# Patient Record
Sex: FEMALE | Race: WHITE
[De-identification: names, ages, dates, MRNs, and addresses within clinical notes are randomized per-mention and may not be internally consistent; named-entity substitution may affect disease eponyms.]

---

## 2019-11-26 ENCOUNTER — HOSPITAL ENCOUNTER (INPATIENT)
Dept: HOSPITAL 63 - ER | Age: 59
LOS: 1 days | Discharge: HOME | DRG: 918 | End: 2019-11-27
Attending: INTERNAL MEDICINE | Admitting: INTERNAL MEDICINE
Payer: COMMERCIAL

## 2019-11-26 VITALS — SYSTOLIC BLOOD PRESSURE: 117 MMHG | DIASTOLIC BLOOD PRESSURE: 76 MMHG

## 2019-11-26 VITALS — DIASTOLIC BLOOD PRESSURE: 102 MMHG | SYSTOLIC BLOOD PRESSURE: 143 MMHG

## 2019-11-26 VITALS — SYSTOLIC BLOOD PRESSURE: 136 MMHG | DIASTOLIC BLOOD PRESSURE: 80 MMHG

## 2019-11-26 VITALS — WEIGHT: 174.56 LBS | BODY MASS INDEX: 32.96 KG/M2 | HEIGHT: 61 IN

## 2019-11-26 VITALS — DIASTOLIC BLOOD PRESSURE: 84 MMHG | SYSTOLIC BLOOD PRESSURE: 134 MMHG

## 2019-11-26 DIAGNOSIS — Z82.5: ICD-10-CM

## 2019-11-26 DIAGNOSIS — I10: ICD-10-CM
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
## 2019-11-26 DIAGNOSIS — T43.291A: Primary | ICD-10-CM

## 2019-11-26 DIAGNOSIS — E78.5: ICD-10-CM

## 2019-11-26 DIAGNOSIS — F17.210: ICD-10-CM

## 2019-11-26 DIAGNOSIS — Z90.710: ICD-10-CM

## 2019-11-26 DIAGNOSIS — J44.9: ICD-10-CM

## 2019-11-26 DIAGNOSIS — F12.90: ICD-10-CM

## 2019-11-26 DIAGNOSIS — E78.00: ICD-10-CM

## 2019-11-26 DIAGNOSIS — F32.9: ICD-10-CM

## 2019-11-26 DIAGNOSIS — Z82.49: ICD-10-CM

## 2019-11-26 DIAGNOSIS — Z90.49: ICD-10-CM

## 2019-11-26 LAB
ALBUMIN SERPL-MCNC: 3.9 G/DL (ref 3.4–5)
ALP SERPL-CCNC: 107 U/L (ref 46–116)
ALT SERPL-CCNC: 33 U/L (ref 14–59)
ANION GAP SERPL CALC-SCNC: 11 MMOL/L (ref 6–14)
APTT PPP: YELLOW S
AST SERPL-CCNC: 19 U/L (ref 15–37)
BACTERIA #/AREA URNS HPF: (no result) /HPF
BASOPHILS # BLD AUTO: 0 X10^3/UL (ref 0–0.2)
BASOPHILS NFR BLD: 1 % (ref 0–3)
BILIRUB DIRECT SERPL-MCNC: 0.1 MG/DL (ref 0–0.2)
BILIRUB SERPL-MCNC: 0.4 MG/DL (ref 0.2–1)
BILIRUB UR QL STRIP: (no result)
CA-I SERPL ISE-MCNC: 9 MG/DL (ref 7–20)
CALCIUM SERPL-MCNC: 9.2 MG/DL (ref 8.5–10.1)
CHLORIDE SERPL-SCNC: 101 MMOL/L (ref 98–107)
CO2 SERPL-SCNC: 25 MMOL/L (ref 21–32)
CREAT SERPL-MCNC: 0.8 MG/DL (ref 0.6–1)
EOSINOPHIL NFR BLD: 0 % (ref 0–3)
EOSINOPHIL NFR BLD: 0 X10^3/UL (ref 0–0.7)
ERYTHROCYTE [DISTWIDTH] IN BLOOD BY AUTOMATED COUNT: 12.8 % (ref 11.5–14.5)
FIBRINOGEN PPP-MCNC: CLEAR MG/DL
GFR SERPLBLD BASED ON 1.73 SQ M-ARVRAT: 73.9 ML/MIN
GLUCOSE SERPL-MCNC: 116 MG/DL (ref 70–99)
GLUCOSE UR STRIP-MCNC: (no result) MG/DL
HCT VFR BLD CALC: 37.5 % (ref 36–47)
HGB BLD-MCNC: 12.7 G/DL (ref 12–15.5)
LYMPHOCYTES # BLD: 1.4 X10^3/UL (ref 1–4.8)
LYMPHOCYTES NFR BLD AUTO: 22 % (ref 24–48)
MAGNESIUM SERPL-MCNC: 2 MG/DL (ref 1.8–2.4)
MCH RBC QN AUTO: 31 PG (ref 25–35)
MCHC RBC AUTO-ENTMCNC: 34 G/DL (ref 31–37)
MCV RBC AUTO: 91 FL (ref 79–100)
MONO #: 0.5 X10^3/UL (ref 0–1.1)
MONOCYTES NFR BLD: 7 % (ref 0–9)
NEUT #: 4.7 X10^3UL (ref 1.8–7.7)
NEUTROPHILS NFR BLD AUTO: 70 % (ref 31–73)
NITRITE UR QL STRIP: (no result)
PLATELET # BLD AUTO: 298 X10^3/UL (ref 140–400)
POTASSIUM SERPL-SCNC: 3.9 MMOL/L (ref 3.5–5.1)
PROT SERPL-MCNC: 7.7 G/DL (ref 6.4–8.2)
RBC # BLD AUTO: 4.12 X10^6/UL (ref 3.5–5.4)
RBC #/AREA URNS HPF: (no result) /HPF (ref 0–2)
SODIUM SERPL-SCNC: 137 MMOL/L (ref 136–145)
SP GR UR STRIP: 1.01
SQUAMOUS #/AREA URNS LPF: (no result) /LPF
UROBILINOGEN UR-MCNC: 0.2 MG/DL
WBC # BLD AUTO: 6.6 X10^3/UL (ref 4–11)
WBC #/AREA URNS HPF: (no result) /HPF (ref 0–4)

## 2019-11-26 PROCEDURE — 80048 BASIC METABOLIC PNL TOTAL CA: CPT

## 2019-11-26 PROCEDURE — 80053 COMPREHEN METABOLIC PANEL: CPT

## 2019-11-26 PROCEDURE — 85025 COMPLETE CBC W/AUTO DIFF WBC: CPT

## 2019-11-26 PROCEDURE — 80076 HEPATIC FUNCTION PANEL: CPT

## 2019-11-26 PROCEDURE — 83735 ASSAY OF MAGNESIUM: CPT

## 2019-11-26 PROCEDURE — 96360 HYDRATION IV INFUSION INIT: CPT

## 2019-11-26 PROCEDURE — 85027 COMPLETE CBC AUTOMATED: CPT

## 2019-11-26 PROCEDURE — 93005 ELECTROCARDIOGRAM TRACING: CPT

## 2019-11-26 PROCEDURE — 81001 URINALYSIS AUTO W/SCOPE: CPT

## 2019-11-26 PROCEDURE — 36415 COLL VENOUS BLD VENIPUNCTURE: CPT

## 2019-11-26 PROCEDURE — 99406 BEHAV CHNG SMOKING 3-10 MIN: CPT

## 2019-11-26 NOTE — HP
ADMIT DATE:  11/26/2019



HISTORY OF PRESENT ILLNESS:  The patient is a 59-year-old  female

patient who presented to the Emergency Room with a report that she accidentally

took a total of 10 of her Wellbutrin tablets over the last 12 hours.  The

patient stated that she took two normal dose of Wellbutrin yesterday, which

consisted two of her 150 mg tablets at the normal time.  She stated that later

on she was having some body aches and cough and accidentally picked up her new

prescription for Wellbutrin thinking that it was her boyfriend's prescription

for ibuprofen.  She stated that she took 8 additional tablets of 300 mg

Wellbutrin XR in the next 12 hours.  She stated that she is feeling very anxious

and she is feeling a little bit short of breath.  Denied any chest pain.  She

realized that she has taken the wrong pills when her boyfriend had looked at the

pill container and realized the patient has been taking her prescription of

Wellbutrin, not his prescription of ibuprofen.  She has had no vomiting or

diarrhea.  Denied any thoughts or intents of self-harm.  She was evaluated in

the Emergency Room and in consultation with the Poison Control Center, a

decision was made to admit her to the hospital for observation.  Her EKG showed

that she has normal sinus rhythm.  There is no QRS widening or QT prolongation,

and basically, the patient will be admitted for 24-hour observation, watching

for QT prolongation and QRS widening and the recommendation of Poison Control

Center was discussed with the patient and was admitted on telemetry bed.  We

will monitor her EKG at least every 2 hours.  Monitor her continuously and treat

her accordingly.



PAST MEDICAL HISTORY:  Significant for hypertension, hyperlipidemia, chronic

obstructive pulmonary disease.



PAST SURGICAL HISTORY:  Cholecystectomy, total abdominal hysterectomy, bilateral

salpingo-oophorectomy, appendectomy.



ALLERGIES:  She has no known drug allergies.



MEDICATIONS:  She is currently on Crestor 10 mg once a day at bedtime,

amlodipine besylate/olmesartan 5/20 one tablet once a day, Wellbutrin 300 mg

once a day, escitalopram oxalate 10 mg daily.  She is on lorazepam 1 mg as

needed at bedtime and Singulair 10 mg at bedtime.



FAMILY HISTORY:  She has one brother who is older at age 61, has myocardial

infarction and COPD.  Father is alive at the age of 79, known to have COPD. 

Mother is alive and has osteoarthritis.



SOCIAL HISTORY:  She is , has 1 daughter from previous marriage.  She

currently lives with her common law .  She smokes 2 cigarettes and then

he smokes marijuana.  Drinks 3 glasses of wine every day.  Work as a loan

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



REVIEW OF SYSTEMS:  The patient denied any blurring of vision, cataract,

glaucoma or macular degeneration.  Denied any earache, tinnitus or sensorineural

deafness.  Denied any nosebleeds, stuffy nose or postnasal drip.  Denied any

sore throat, sore tongue, toothache, hoarseness of voice or difficulty

swallowing.  She denied any hematemesis, melena, hematochezia.  Denied any

dysuria, frequency or hematuria.  Denied any chest pain, did complain of

shortness of breath.  Denied any cough, phlegm or hemoptysis.  Did complain of

some dizziness.



PHYSICAL EXAMINATION:

GENERAL:  On arrival to the Emergency Room, she looked well and was clearly in

no apparent respiratory distress.  No pallor, jaundice, cyanosis or thyromegaly.

 No jugular venous distention.  No limb edema.

VITAL SIGNS:  Her heart rate was 91, blood pressure was 146/93, temperature was

98, respiratory rate was 24, and oxygen saturation was 99% on room air.

HEAD, EYES, EARS, NOSE AND THROAT:  Showed she is normocephalic, atraumatic.

NECK:  Supple.

HEART:  Showed normal first and second heart sounds.  No gallop or murmur.

CHEST:  Clear to auscultation.  No crepitation or rhonchi.

ABDOMEN:  Distended, soft, nontender.

NEUROLOGIC:  She was awake, alert, responding appropriately.  All cranial nerves

are intact.

EXTREMITIES:  She moves extremities without difficulty.  She ambulates without

assistance or assistive devices.



LABORATORY DATA:  Showed a serum sodium 137, potassium 3.9, chloride 101,

bicarbonate 25, anion gap of 11, BUN 9, creatinine 0.8, estimated GFR was 74 mL

per minute.  Her glucose was 116, calcium was 9.2, magnesium 2.  Total

bilirubin, AST, ALT, alkaline phosphatase were normal.  Total protein was 7.7,

albumin was 3.9.  White cell count was 6600, hemoglobin 12.7, hematocrit 37, MCV

91, and platelet count of 298,000.  Urinalysis was essentially unremarkable.



ASSESSMENT AND PLAN:  In summary, this is a 59-year-old who was admitted with

accidental overdose of her Wellbutrin.  She took about 10 of her 300 mg extended

release tablet.



PLAN:  To admit the patient for observation.  We will monitor her prolongation

of the QT interval and QRS and treat her accordingly.





______________________________

JOSE ALBERTO GARCIA MD



DR:  FREDERICK/jose  JOB#:  [id number] / [id number]

DD:  11/26/2019 13:46  DT:  11/26/2019 14:24

## 2019-11-26 NOTE — NUR
NURSING NOTE ADMIT 



PT ADMIT TO ROOM 121 FROM ED VIA EMS ACCOMPANIED BY EMS PERSONNEL. PT IS TEARFUL ON ARRIVAL 
AND STATES SHE WAS ACHY AND NOT FEELING WELL LAST NIGHT, STATES HER SIGNIFICANT OTHER HAD 
LEFT OVER IBU FROM DENTAL WORK AND SHE THOUGHT SHE WAS TAKING IBU BUT THIS AM WHEN SHE WOKE 
UP SHE NOTICED IT WAS BUPROPION. PT STATES SHE HAS HAD A COLD&COUGH AND SINUS CONGESTION. 
COUGH WITH CLEAR SPUTUM. PT ADMIT FOR OBSERVATION. 



TOD ESCOBEDO.

## 2019-11-26 NOTE — PHYS DOC
Past History


Past Medical History:  Depression, High Cholesterol, Hypotension


Past Surgical History:  Appendectomy, Cholecystectomy, Hysterectomy


Additional Alcohol Information:  


glass of wine nightly


Drug Use:  None





Adult General


Chief Complaint


Chief Complaint:  ACCIDENTAL INGESTION





HPI


HPI





Patient is a 59-year-old female who presents with report that she accidentally 

took a total of 10 of her Wellbutrin tablets over the last 12 hours. Patient 

states that she took her normal dose of Wellbutrin yesterday which consisted of 

2 of her 150 mg tablets at the normal time. She states that later on she was 

having some body aches and some cough and accidentally picked up her new 

prescription for Wellbutrin, thinking that it was her boyfriend's prescription 

for ibuprofen. She states that she took 8 additional tablets of 300 mg 

Wellbutrin XR's the next 12 hours. Patient states that she's feeling very 

anxious and is feeling a little bit short of breath. She denies any chest pain. 

She realized that she had taken the wrong pills when her boyfriend had looked at

the pill container and realized that patient had been taking her prescription of

Wellbutrin, not his prescription for ibuprofen. Patient has had no vomiting or 

diarrhea. She denies any thoughts or intent of self-harm.[]





Review of Systems


Review of Systems





Constitutional: Denies fever or chills []


Respiratory: Positive shortness of breath []


Cardiovascular: No additional information not addressed in HPI []


GI: Denies abdominal pain, nausea, vomiting or diarrhea []


Musculoskeletal: Positive body aches and joint pain []


Integument: Denies rash or skin lesions []


Neurologic: Denies headache, focal weakness or sensory changes []





All other systems were reviewed and found to be within normal limits, except as 

documented in this note.





Current Medications


Current Medications





Current Medications








 Medications


  (Trade)  Dose


 Ordered  Sig/Robert  Start Time


 Stop Time Status Last Admin


Dose Admin


 


 Sodium Chloride  1,000 ml @ 


 1,000 mls/hr  Q1H  11/26/19 09:33


 11/26/19 10:32 DC 11/26/19 09:47


1,000 MLS/HR











Allergies


Allergies





Allergies








Coded Allergies Type Severity Reaction Last Updated Verified


 


  No Known Drug Allergies    11/26/19 No











Physical Exam


Physical Exam





Constitutional: Well developed, well nourished, no acute distress, non-toxic 

appearance. []


HENT: Normocephalic, atraumatic, bilateral external ears normal, oropharynx 

moist, no oral exudates, nose normal. []


Eyes: PERRLA, EOMI, conjunctiva normal, no discharge. [] 


Neck: Normal range of motion, no tenderness, supple, no stridor. [] 


Cardiovascular: Mildly tachycardic rate with regular rhythm[]


Lungs & Thorax:  Bilateral breath sounds clear to auscultation []


Abdomen: Bowel sounds normal, soft, no tenderness. [] 


Skin: Warm, dry, no erythema, no rash. [] 


Extremities: No tenderness, no cyanosis, no clubbing, ROM intact. [] 


Neurologic: Alert and oriented X 3, no focal deficits noted. []





Current Patient Data


Vital Signs





                                   Vital Signs








  Date Time  Temp Pulse Resp B/P (MAP) Pulse Ox O2 Delivery O2 Flow Rate FiO2


 


11/26/19 11:22  101 24 154/ 97   


 


11/26/19 10:38      Room Air  








Lab Results





                                Laboratory Tests








Test


 11/26/19


09:42 11/26/19


10:45


 


White Blood Count


 6.6 x10^3/uL


(4.0-11.0) 





 


Red Blood Count


 4.12 x10^6/uL


(3.50-5.40) 





 


Hemoglobin


 12.7 g/dL


(12.0-15.5) 





 


Hematocrit


 37.5 %


(36.0-47.0) 





 


Mean Corpuscular Volume


 91 fL ()


 





 


Mean Corpuscular Hemoglobin 31 pg (25-35)   


 


Mean Corpuscular Hemoglobin


Concent 34 g/dL


(31-37) 





 


Red Cell Distribution Width


 12.8 %


(11.5-14.5) 





 


Platelet Count


 298 x10^3/uL


(140-400) 





 


Neutrophils (%) (Auto) 70 % (31-73)   


 


Lymphocytes (%) (Auto) 22 % (24-48)  L 


 


Monocytes (%) (Auto) 7 % (0-9)   


 


Eosinophils (%) (Auto) 0 % (0-3)   


 


Basophils (%) (Auto) 1 % (0-3)   


 


Neutrophils # (Auto)


 4.7 x10^3uL


(1.8-7.7) 





 


Lymphocytes # (Auto)


 1.4 x10^3/uL


(1.0-4.8) 





 


Monocytes # (Auto)


 0.5 x10^3/uL


(0.0-1.1) 





 


Eosinophils # (Auto)


 0.0 x10^3/uL


(0.0-0.7) 





 


Basophils # (Auto)


 0.0 x10^3/uL


(0.0-0.2) 





 


Sodium Level


 137 mmol/L


(136-145) 





 


Potassium Level


 3.9 mmol/L


(3.5-5.1) 





 


Chloride Level


 101 mmol/L


() 





 


Carbon Dioxide Level


 25 mmol/L


(21-32) 





 


Anion Gap 11 (6-14)   


 


Blood Urea Nitrogen


 9 mg/dL (7-20)


 





 


Creatinine


 0.8 mg/dL


(0.6-1.0) 





 


Estimated GFR


(Cockcroft-Gault) 73.9  


 





 


Glucose Level


 116 mg/dL


(70-99)  H 





 


Calcium Level


 9.2 mg/dL


(8.5-10.1) 





 


Magnesium Level


 2.0 mg/dL


(1.8-2.4) 





 


Total Bilirubin


 0.4 mg/dL


(0.2-1.0) 





 


Direct Bilirubin


 0.1 mg/dL


(0.0-0.2) 





 


Aspartate Amino Transferase


(AST) 19 U/L (15-37)


 





 


Alanine Aminotransferase (ALT)


 33 U/L (14-59)


 





 


Alkaline Phosphatase


 107 U/L


() 





 


Total Protein


 7.7 g/dL


(6.4-8.2) 





 


Albumin


 3.9 g/dL


(3.4-5.0) 





 


Urine Collection Type  Void  


 


Urine Color  Yellow  


 


Urine Clarity  Clear  


 


Urine pH  6.5  


 


Urine Specific Gravity  1.010  


 


Urine Protein


 


 Neg


(NEG-TRACE)


 


Urine Glucose (UA)


 


 Neg mg/dL


(NEG)


 


Urine Ketones (Stick)


 


 Neg mg/dL


(NEG)


 


Urine Blood  Neg (NEG)  


 


Urine Nitrite  Neg (NEG)  


 


Urine Bilirubin  Neg (NEG)  


 


Urine Urobilinogen Dipstick


 


 0.2 mg/dL (0.2


mg/dL)


 


Urine Leukocyte Esterase  Neg (NEG)  


 


Urine RBC


 


 Rare /HPF


(0-2)


 


Urine WBC


 


 Occ /HPF (0-4)





 


Urine Squamous Epithelial


Cells 


 Few /LPF  





 


Urine Bacteria


 


 Few /HPF


(0-FEW)











EKG


EKG


EKG demonstrates a normal sinus rhythm. There is no QRS widening or QT 

prolongation.[]





Radiology/Procedures


Radiology/Procedures


[]





Course & Med Decision Making


Course & Med Decision Making


Pertinent Labs and Imaging studies reviewed. (See chart for details)





Patient moved to room upon arrival was evaluated by your medical staff after 

which an IV was established and blood work was drawn. An EKG was obtained. 

Poison control was ultimately contacted and they're recommending 24-hour 

observation, watching for QT prolongation or QRS widening. Poison control 

recommendations were discussed with patient and patient admitted under the care 

of Dr. Adame.





Alfredo Disclaimer


Alfredo Disclaimer


This electronic medical record was generated, in whole or in part, using a voice

 recognition dictation system.





Departure


Departure:


Impression:  


   Primary Impression:  


   Bupropion overdose


Disposition:  09 ADMITTED AS INPATIENT


Admitting Physician:  Kristine Adame


Condition:  GOOD


Referrals:  


BOWEN ALEXANDER (PCP)





Problem Qualifiers








   Primary Impression:  


   Bupropion overdose


   Encounter type:  initial encounter  Injury intent:  accidental or 

   unintentional  Qualified Codes:  T43.291A - Poisoning by other 

   antidepressants, accidental (unintentional), initial encounter








APRIL PRICE Jr. DO          Nov 26, 2019 11:40

## 2019-11-26 NOTE — EKG
[facility] [address], [place] [address]

Test Date:    2019               Test Time:    10:01:55

Pat Name:     ERINN GONZALES             Department:   

Patient ID:   [id number]           Room:          

Gender:       F                        Technician:   DAVID

:          1960               Requested By: APRIL PRICE

Order Number: [id number]            Reading MD:     

                                 Measurements

Intervals                              Axis          

Rate:         82                       P:            46

[place]:           168                      QRS:          23

QRSD:         94                       T:            27

QT:           390                                    

QTc:          459                                    

                           Interpretive Statements

SINUS RHYTHM

R-S TRANSITION ZONE IN V LEADS DISPLACED TO THE RIGHT

OTHERWISE NORMAL ECG

RI6.01

No previous ECG available for comparison

## 2019-11-26 NOTE — EKG
[facility] [address], [place] [address]

Test Date:    2019               Test Time:    15:12:52

Pat Name:     ERINN GONZALES             Department:   

Patient ID:   [id number]           Room:         121 A

Gender:       F                        Technician:   TYRELL

:          1960               Requested By: JOSE ALBERTO GARCIA

Order Number: [id number]            Reading MD:     

                                 Measurements

Intervals                              Axis          

Rate:         80                       P:            34

[place]:           178                      QRS:          24

QRSD:         82                       T:            4

QT:           390                                    

QTc:          453                                    

                           Interpretive Statements

SINUS RHYTHM

R-S TRANSITION ZONE IN V LEADS DISPLACED TO THE RIGHT

NO SPECIFIC ECG ABNORMALITIES

RI6.02

No previous ECG available for comparison

## 2019-11-27 VITALS — DIASTOLIC BLOOD PRESSURE: 86 MMHG | SYSTOLIC BLOOD PRESSURE: 139 MMHG

## 2019-11-27 VITALS — SYSTOLIC BLOOD PRESSURE: 150 MMHG | DIASTOLIC BLOOD PRESSURE: 95 MMHG

## 2019-11-27 VITALS — DIASTOLIC BLOOD PRESSURE: 87 MMHG | SYSTOLIC BLOOD PRESSURE: 156 MMHG

## 2019-11-27 LAB
ALBUMIN SERPL-MCNC: 3.8 G/DL (ref 3.4–5)
ALBUMIN/GLOB SERPL: 1 {RATIO} (ref 1–1.7)
ALP SERPL-CCNC: 105 U/L (ref 46–116)
ALT SERPL-CCNC: 42 U/L (ref 14–59)
ANION GAP SERPL CALC-SCNC: 9 MMOL/L (ref 6–14)
AST SERPL-CCNC: 30 U/L (ref 15–37)
BILIRUB SERPL-MCNC: 0.4 MG/DL (ref 0.2–1)
BUN/CREAT SERPL: 8 (ref 6–20)
CA-I SERPL ISE-MCNC: 6 MG/DL (ref 7–20)
CALCIUM SERPL-MCNC: 9.3 MG/DL (ref 8.5–10.1)
CHLORIDE SERPL-SCNC: 103 MMOL/L (ref 98–107)
CO2 SERPL-SCNC: 27 MMOL/L (ref 21–32)
CREAT SERPL-MCNC: 0.8 MG/DL (ref 0.6–1)
ERYTHROCYTE [DISTWIDTH] IN BLOOD BY AUTOMATED COUNT: 13 % (ref 11.5–14.5)
GFR SERPLBLD BASED ON 1.73 SQ M-ARVRAT: 73.9 ML/MIN
GLOBULIN SER-MCNC: 3.7 G/DL (ref 2.2–3.8)
GLUCOSE SERPL-MCNC: 148 MG/DL (ref 70–99)
HCT VFR BLD CALC: 37.4 % (ref 36–47)
HGB BLD-MCNC: 12.6 G/DL (ref 12–15.5)
MCH RBC QN AUTO: 31 PG (ref 25–35)
MCHC RBC AUTO-ENTMCNC: 34 G/DL (ref 31–37)
MCV RBC AUTO: 92 FL (ref 79–100)
PLATELET # BLD AUTO: 297 X10^3/UL (ref 140–400)
POTASSIUM SERPL-SCNC: 4 MMOL/L (ref 3.5–5.1)
PROT SERPL-MCNC: 7.5 G/DL (ref 6.4–8.2)
RBC # BLD AUTO: 4.08 X10^6/UL (ref 3.5–5.4)
SODIUM SERPL-SCNC: 139 MMOL/L (ref 136–145)
WBC # BLD AUTO: 5.5 X10^3/UL (ref 4–11)

## 2019-11-27 NOTE — EKG
[facility] [address], [place] [address]

Test Date:    2019               Test Time:    10:37:41

Pat Name:     ERINN GONZALES             Department:   

Patient ID:   [id number]           Room:         120 A

Gender:       F                        Technician:   TYRELL

:          1960               Requested By: JOSE ALBERTO GARCIA

Order Number: [id number]            Reading MD:     

                                 Measurements

Intervals                              Axis          

Rate:         74                       P:            31

OR:           178                      QRS:          21

QRSD:         82                       T:            10

QT:           390                                    

QTc:          433                                    

                           Interpretive Statements

SINUS RHYTHM

R-S TRANSITION ZONE IN V LEADS DISPLACED TO THE RIGHT

OTHERWISE NORMAL ECG

RI6.02

No previous ECG available for comparison

## 2019-11-27 NOTE — EKG
[facility] [address], [place] [address]

Test Date:    2019               Test Time:    04:15:36

Pat Name:     ERINN GONZALES             Department:   

Patient ID:   [id number]           Room:         120 A

Gender:       F                        Technician:   TYRELL

:          1960               Requested By: JOSE ALBERTO GARCIA

Order Number: [id number]            Reading MD:     

                                 Measurements

Intervals                              Axis          

Rate:         69                       P:            37

N[place]:           186                      QRS:          26

QRSD:         84                       T:            3

QT:           414                                    

QTc:          445                                    

                           Interpretive Statements

SINUS RHYTHM

R-S TRANSITION ZONE IN V LEADS DISPLACED TO THE RIGHT

T ABNORMALITY IN INFERIOR LEADS

ABNORMAL ECG

RI6.02

No previous ECG available for comparison

## 2019-11-27 NOTE — EKG
[facility] [address], [place] [address]

Test Date:    2019               Test Time:    19:02:39

Pat Name:     ERINN GONZALES             Department:   

Patient ID:   [id number]           Room:         121 A

Gender:       F                        Technician:   TYRELL

:          1960               Requested By: JOSE ALBERTO GARCIA

Order Number: [id number]            Reading MD:     

                                 Measurements

Intervals                              Axis          

Rate:         81                       P:            219

[place]:           112                      QRS:          29

QRSD:         84                       T:            28

QT:           390                                    

QTc:          459                                    

                           Interpretive Statements

SINUS RHYTHM

ST & T ABNORMALITY, CONSIDER

ANTERIOR ISCHEMIA OR LEFT VENTRICULAR STRAIN

ABNORMAL ECG

RI6.02

No previous ECG available for comparison

## 2019-11-27 NOTE — EKG
[facility] [address], [place] [address]

Test Date:    2019               Test Time:    21:25:35

Pat Name:     ERINN GONZALES             Department:   

Patient ID:   [id number]           Room:         121 A

Gender:       F                        Technician:   TYRELL

:          1960               Requested By: JOSE ALBERTO GARCIA

Order Number: [id number]            Reading MD:     

                                 Measurements

Intervals                              Axis          

Rate:         78                       P:            26

IN:           176                      QRS:          21

QRSD:         84                       T:            17

QT:           400                                    

QTc:          460                                    

                           Interpretive Statements

SINUS RHYTHM

NO SPECIFIC ECG ABNORMALITIES

RI6.02

No previous ECG available for comparison

## 2019-11-27 NOTE — DS
DATE OF DISCHARGE:  11/27/2019



HOSPITAL COURSE:  The patient is a 59-year-old  female patient who was

admitted with accidental overdose of her Wellbutrin.  She took about ten of the

300 mg tablets thinking that they were her 's ibuprofen for her aches and

pains.  She was admitted and was followed closely as per Poison Control

recommendation.  We did her EKG and followed her QT interval and also the QRS

complex and the Poison Control Center was contacted regarding and was given

updates on the patient's clinical status, EKG and QT interval and QRS complex

duration and the Poison Control Center said the patient was medically cleared

and can be sent home and that they are closing out her case.  When I saw her

this afternoon, she was resting and was sitting on the chair comfortably in no

apparent distress.



PHYSICAL EXAMINATION:

GENERAL:  On examining her, she looked well and there was clearly no pallor,

jaundice, cyanosis or thyromegaly.  No jugular venous distension.  No lower limb

edema.

VITAL SIGNS:  Her heart rate was 77, blood pressure 150/95, temperature was

97.7, respiratory rate was 20 and oxygen saturation was 97%.

HEAD, EYES, EARS, NOSE AND THROAT:  Showed normocephalic, atraumatic.

NECK:  Supple.

HEART:  Showed normal first and second heart sounds.  No gallop or murmur.

CHEST:  Clear to auscultation.  No crepitation or rhonchi.

ABDOMEN:  Distended, soft, nontender.

NEUROLOGIC:  She was awake, alert, responding appropriately.  All cranial nerves

intact.  She moves extremities without difficulty.  She ambulates without

assistance or assistive devices.



Her intake was 2500, no output was recorded.



LABORATORY DATA:  As of this morning showed serum sodium 139, potassium 4,

chloride 103, bicarbonate 27, anion gap of 9, BUN 6, creatinine 0.8, estimated

GFR was 74 mL per minute.  Her [id number], calcium was 9.3, magnesium 2.  Total

bilirubin, AST, ALT, alkaline phosphatase were normal.  Total protein 7.5,

albumin 3.8.  Urinalysis was essentially unremarkable.



DISCHARGE MEDICATIONS:  The patient was discharged home to continue on

following:  Amlodipine/olmesartan 5/20 one tablet once a day, Wellbutrin 300 mg

once a day, escitalopram oxalate 10 mg once a day, lorazepam for Ativan 1 mg

p.o. at bedtime as needed, montelukast for Singulair 10 mg at bedtime and

Crestor 10 mg at bedtime.



FINAL DISCHARGE DIAGNOSES:  Accidental overdose on of her Wellbutrin.  Other

medical problems include hyperlipidemia, hypertension and depression.





______________________________

JOSE ALBERTO GARCIA MD



DR:  FREDERICK/jose  JOB#:  [id number] / [id number]

DD:  11/27/2019 14:23  DT:  11/27/2019 15:58

## 2019-11-27 NOTE — EKG
[facility] [address], [place] [address]

Test Date:    2019               Test Time:    18:52:46

Pat Name:     ERINN GONZALES             Department:   

Patient ID:   [id number]           Room:         121 A

Gender:       F                        Technician:   TYRELL

:          1960               Requested By: JOSE ALBERTO GARCIA

Order Number: [id number]            Reading MD:     

                                 Measurements

Intervals                              Axis          

Rate:         89                       P:            254

VT:           0                        QRS:          17

QRSD:         244                      T:            218

QT:           384                                    

QTc:          468                                    

                           Interpretive Statements

SUPRAVENTRICULAR RHYTHM

PROLONGED VT INTERVAL

RIGHT BUNDLE BRANCH BLOCK

ABNORMAL ECG

RI6.02

No previous ECG available for comparison

## 2019-11-27 NOTE — EKG
[facility] [address], [place] [address]

Test Date:    2019               Test Time:    17:06:28

Pat Name:     ERINN GONZALES             Department:   

Patient ID:   [id number]           Room:         121 A

Gender:       F                        Technician:   TYRELL

:          1960               Requested By: JOSE ALBERTO GARCIA

Order Number: [id number]            Reading MD:     

                                 Measurements

Intervals                              Axis          

Rate:         82                       P:            23

[place]:           176                      QRS:          26

QRSD:         82                       T:            9

QT:           404                                    

QTc:          475                                    

                           Interpretive Statements

SINUS RHYTHM

PROLONGED QT

NO SPECIFIC ECG ABNORMALITIES

RI6.02

No previous ECG available for comparison

## 2019-11-27 NOTE — NUR
NURSING NOTE DISCHARGE



PT DISCHARGED FROM HOSPITAL HOME VIA AMBULATION ACCOMPANIED BY SPOUSE. WRITTEN AND VERBAL 
DISCHARGE INSTRUCTIONS GIVEN TO PT. PER DR GARCIA, RESTART WELBUTRIN AFTER 24 HOURS. NO 
COMPLICATIONS.



TOD ESCOBEDO.

## 2019-11-27 NOTE — NUR
NURSING NOTE POISON CONTROL



[facility] FROM [facility] CALLED AND GIVEN UPDATES ON PT, STATES PT IS MEDIALLY 
CLEARED AND CAN BE SENT HOME AND THEY ARE CLOSING OUT HER CASE. 



TOD ESCOBEDO.

## 2019-11-27 NOTE — EKG
[facility] [address], [place] [address]

Test Date:    2019               Test Time:    07:11:29

Pat Name:     ERINN GONZALES             Department:   

Patient ID:   [id number]           Room:         120 A

Gender:       F                        Technician:   TYRELL

:          1960               Requested By: JOSE ALBERTO GARCIA

Order Number: [id number]            Reading MD:     

                                 Measurements

Intervals                              Axis          

Rate:         64                       P:            30

SC:           178                      QRS:          24

QRSD:         84                       T:            12

QT:           422                                    

QTc:          440                                    

                           Interpretive Statements

SINUS RHYTHM

R-S TRANSITION ZONE IN V LEADS DISPLACED TO THE RIGHT

OTHERWISE NORMAL ECG

RI6.02

No previous ECG available for comparison

## 2019-11-27 NOTE — EKG
[facility] [address], [place] [address]

Test Date:    2019               Test Time:    23:20:01

Pat Name:     ERINN GONZALES             Department:   

Patient ID:   [id number]           Room:         120 A

Gender:       F                        Technician:   TYRELL

:          1960               Requested By: JOSE ALBERTO GARCIA

Order Number: [id number]            Reading MD:     

                                 Measurements

Intervals                              Axis          

Rate:         71                       P:            34

AL:           178                      QRS:          21

QRSD:         86                       T:            2

QT:           408                                    

QTc:          443                                    

                           Interpretive Statements

SINUS RHYTHM

R-S TRANSITION ZONE IN V LEADS DISPLACED TO THE RIGHT

NO SPECIFIC ECG ABNORMALITIES

RI6.02

No previous ECG available for comparison